# Patient Record
Sex: FEMALE | Race: ASIAN | URBAN - METROPOLITAN AREA
[De-identification: names, ages, dates, MRNs, and addresses within clinical notes are randomized per-mention and may not be internally consistent; named-entity substitution may affect disease eponyms.]

---

## 2017-06-06 ENCOUNTER — HOSPITAL ENCOUNTER (OUTPATIENT)
Dept: ULTRASOUND IMAGING | Facility: CLINIC | Age: 72
End: 2017-06-06
Attending: FAMILY MEDICINE
Payer: MEDICARE

## 2017-06-06 ENCOUNTER — HOSPITAL ENCOUNTER (OUTPATIENT)
Dept: MAMMOGRAPHY | Facility: CLINIC | Age: 72
Discharge: HOME | End: 2017-06-06
Attending: FAMILY MEDICINE | Admitting: FAMILY MEDICINE
Payer: MEDICARE

## 2017-06-06 DIAGNOSIS — R92.8 ABNORMAL MAMMOGRAM: ICD-10-CM

## 2017-06-06 PROCEDURE — 76642 ULTRASOUND BREAST LIMITED: CPT | Mod: LT

## 2017-06-06 PROCEDURE — G0206 DX MAMMO INCL CAD UNI: HCPCS

## 2017-10-05 ENCOUNTER — OFFICE VISIT (OUTPATIENT)
Dept: FAMILY MEDICINE | Facility: CLINIC | Age: 72
End: 2017-10-05
Payer: MEDICARE

## 2017-10-05 VITALS
WEIGHT: 120.3 LBS | SYSTOLIC BLOOD PRESSURE: 175 MMHG | RESPIRATION RATE: 16 BRPM | TEMPERATURE: 97.5 F | DIASTOLIC BLOOD PRESSURE: 85 MMHG | HEART RATE: 77 BPM

## 2017-10-05 DIAGNOSIS — Z71.84 TRAVEL ADVICE ENCOUNTER: Primary | ICD-10-CM

## 2017-10-05 DIAGNOSIS — A09 TRAVELER'S DIARRHEA: ICD-10-CM

## 2017-10-05 PROCEDURE — 90691 TYPHOID VACCINE IM: CPT | Performed by: FAMILY MEDICINE

## 2017-10-05 PROCEDURE — 90471 IMMUNIZATION ADMIN: CPT | Performed by: FAMILY MEDICINE

## 2017-10-05 PROCEDURE — 99402 PREV MED CNSL INDIV APPRX 30: CPT | Mod: 25 | Performed by: FAMILY MEDICINE

## 2017-10-05 RX ORDER — ATORVASTATIN CALCIUM 20 MG/1
20 TABLET, FILM COATED ORAL
COMMUNITY
Start: 2017-05-03

## 2017-10-05 RX ORDER — LISINOPRIL 20 MG/1
20 TABLET ORAL
COMMUNITY
Start: 2017-10-04

## 2017-10-05 RX ORDER — METFORMIN HYDROCHLORIDE 500 MG/1
500 TABLET, FILM COATED, EXTENDED RELEASE ORAL
COMMUNITY
Start: 2017-10-04

## 2017-10-05 RX ORDER — ASPIRIN 81 MG/1
81 TABLET ORAL
COMMUNITY
Start: 2016-06-17

## 2017-10-05 RX ORDER — CIPROFLOXACIN 500 MG/1
500 TABLET, FILM COATED ORAL 2 TIMES DAILY
Qty: 12 TABLET | Refills: 0 | Status: SHIPPED | OUTPATIENT
Start: 2017-10-05

## 2017-10-05 NOTE — PROGRESS NOTES
Itinerary:  [place]  Departure Date: 10/22/2017, Return Date: 04/18/2018  Reason for Travel (i.e. business, pleasure): pleasure  Visiting an urban or rural area? both  Accommodations (i.e. hotel, hostel, friends, family etc.): family  Women - First day of your last period: NA    IMMUNIZATION HISTORY  Have you received any vaccinations in the past 4 weeks?  Yes ---flu vaccine  Have you ever fainted from having your blood drawn or from an injection?  No  Have you ever had a fever reaction to a vaccination?  No  Have you ever had any bad reaction / side effect from any vaccination?  No  Have you ever had hepatitis A or B vaccine?  Yes  Do you live (or work closely with anyone who has AIDS, or any other immune disorder, or who is on chemotherapy for cancer or family history of immunodeficiency?  No  Have you received any injection of immune globulin or any blood products during the past 12 months?  No    GENERAL MEDICAL HISTORY  Do you have a medical condition that warrants maintenance medication or physician follow-up?  No  Do you have a medical condition that is stable now, but that may recur while traveling?  No  Has your spleen been removed?   No  Have you had an acute illness or a fever in the past 48 hours?  No  Are you pregnant or might you become pregnant on this trip? Any chance of pregnancy?  No  Are you breastfeeding?  No  Do you have HIV, AIDS, an AIDS-like condition, any other immune disorder, leukemia or cancer?  No  Do you have a severe combined immunodeficiency disease?  No  Have you had your thymus gland removed or a history of problems with your thymus, such as myasthenia gravis, DiGeorge syndrome, or thymoma?  No  Do you have severe thrombocytopenia (low platelet count) or blood clotting disorder?  No  Have you ever had a convulsion, seizure, epilepsy, neurologic condition or brain infection?  No  Do you have any stomach conditions?  No  Do you have a G6PD deficiency?  No  Do you have severe renal  or kidney impairment?  No  Do you have a history of psychiatric problems?  No  Do you have a problem with strange dreams and/or nightmares?  No  Do you have insomnia?  No  Do you have problems with vaginitis?  No  Do you have psoriasis?  No  Are you prone to motion sickness?  No  Have you ever had headaches, nausea, vomiting or breathing problems from altitude exposure?  No    MEDICATIONS  ARE YOU TAKING:  Steroids, prednisone, or anti-cancer drugs?  No  Antibiotics or sulfonamides?  No  Oral contraceptives?  No  Aspirin therapy? (children & adolescents)  No    ALLERGIES  ARE YOU ALLERGIC TO:  Any medications?  No  Any foods or other?  No     SUBJECTIVE: Jamarcus Hernández , a 71 year old  female, presents for counseling and information regarding upcoming travel to [facility], [facility]. Special medical concerns   include: none.  She. anticipates the following unusual exposures: none.     Past Medical History:   Diagnosis Date     Diabetes (H)      Hypertension       Immunization History   Administered Date(s) Administered     HepA-Adult 07/13/2007, 12/03/2009     Hepatitis B Immunity: Titer 01/01/2015     Influenza (High Dose) 3 valent vaccine 10/04/2017     Pneumococcal (PCV 13) 05/15/2015     Pneumococcal 23 valent 10/20/2010     TDAP Vaccine (Adacel) 11/26/2014     Typhoid IM 10/05/2017     Zoster vaccine, live 05/15/2015       Current Outpatient Prescriptions   Medication Sig Dispense Refill     blood glucose monitoring (HENRIK CONTOUR) test strip Test glucose once daily       blood glucose monitoring (HENRIK MICROLET) lancets Check glucose once daily       aspirin EC 81 MG EC tablet Take 81 mg by mouth       atorvastatin (LIPITOR) 20 MG tablet Take 20 mg by mouth Take 20 mg by mouth       lisinopril (PRINIVIL/ZESTRIL) 20 MG tablet Take 20 mg by mouth Take 20 mg by mouth       metFORMIN modified (GLUMETZA) 500 MG 24 hr tablet Take 500 mg by mouth Take 500 mg by mouth       ciprofloxacin (CIPRO) 500 MG tablet  Take 1 tablet (500 mg) by mouth 2 times daily x 3 days For traveler's diarrhea 12 tablet 0     [DISCONTINUED] ATORVASTATIN CALCIUM PO        [DISCONTINUED] LISINOPRIL PO        [DISCONTINUED] METFORMIN HCL PO        No Known Allergies     EXAM: deferred  Immunizations discussed include: Typhoid  Malaraia prophylaxis recommended: none  Symptomatic treatment for traveler's diarrhea: ciprofloxacin    ASSESSMENT/PLAN:  (Z71.89) Travel advice encounter  (primary encounter diagnosis)  Plan: TYPHOID VACCINE, IM    (A09) Traveler's diarrhea  Plan: ciprofloxacin (CIPRO) 500 MG tablet    I have reviewed general recommendations for safe travel   including: food/water precautions, insect avoidance, safe sex   practices given high prevalence of HIV and other STDs,   roadway safety. Educational materials and links to the [employer]  Traveler's health website have been provided.    Total time 30 minutes, greater than 50 percent in counseling   and coordination of care.    Celeste Diaz MD   [facility]

## 2017-10-05 NOTE — NURSING NOTE
Chief Complaint   Patient presents with     Travel Clinic     pt traveling to [place], date of departure 10/22/2017 and returning on 04/18/2018       Initial /85 (BP Location: Right arm, Patient Position: Chair, Cuff Size: Adult Regular)  Pulse 77  Temp 97.5  F (36.4  C) (Oral)  Resp 16  Wt 120 lb 4.8 oz (54.6 kg)  Breastfeeding? No There is no height or weight on file to calculate BMI.  Medication Reconciliation: complete     Krystyna Almeida/STEVENSON  [place]

## 2017-10-05 NOTE — MR AVS SNAPSHOT
"              After Visit Summary   10/5/2017    Jamarcus Hernández    MRN: [id number]           Patient Information     Date Of Birth          1945        Visit Information        Provider Department      10/5/2017 10:00 AM Celeste Multani MD; [employer] [facility]        Today's Diagnoses     Travel advice encounter    -  1    Traveler's diarrhea          Care Instructions    See handout provided          Follow-ups after your visit        Who to contact     If you have questions or need follow up information about today's clinic visit or your schedule please contact [facility] directly at [phone].  Normal or non-critical lab and imaging results will be communicated to you by MyChart, letter or phone within 4 business days after the clinic has received the results. If you do not hear from us within 7 days, please contact the clinic through [employer] or phone. If you have a critical or abnormal lab result, we will notify you by phone as soon as possible.  Submit refill requests through [employer] or call your pharmacy and they will forward the refill request to us. Please allow 3 business days for your refill to be completed.          Additional Information About Your Visit        MyChart Information     [employer] lets you send messages to your doctor, view your test results, renew your prescriptions, schedule appointments and more. To sign up, go to [email or web address] . Click on \"Log in\" on the left side of the screen, which will take you to the Welcome page. Then click on \"Sign up Now\" on the right side of the page.     You will be asked to enter the access code listed below, as well as some personal information. Please follow the directions to create your username and password.     Your access code is: [email or web address]  Expires: 1/3/2018 10:43 AM     Your access code will  in 90 days. If you need help or a new code, please call your [place] [facility] or [phone].        Care EveryWhere ID     This is your Care EveryWhere ID. This could be used by other organizations to access your [place] medical records  [id number]        Your Vitals Were     Pulse Temperature Respirations Breastfeeding?          77 97.5  F (36.4  C) (Oral) 16 No         Blood Pressure from Last 3 Encounters:   10/05/17 175/85   05/28/15 138/76   08/18/12 157/96    Weight from Last 3 Encounters:   10/05/17 120 lb 4.8 oz (54.6 kg)              Today, you had the following     No orders found for display         Today's Medication Changes          These changes are accurate as of: 10/5/17 10:43 AM.  If you have any questions, ask your nurse or doctor.               Start taking these medicines.        Dose/Directions    ciprofloxacin 500 MG tablet   Commonly known as:  CIPRO   Used for:  Traveler's diarrhea   Started by:  Celeste Multani MD        Dose:  500 mg   Take 1 tablet (500 mg) by mouth 2 times daily x 3 days For traveler's diarrhea   Quantity:  12 tablet   Refills:  0         These medicines have changed or have updated prescriptions.        Dose/Directions    atorvastatin 20 MG tablet   Commonly known as:  LIPITOR   This may have changed:  Another medication with the same name was removed. Continue taking this medication, and follow the directions you see here.   Changed by:  Celeste Multani MD        Dose:  20 mg   Take 20 mg by mouth Take 20 mg by mouth   Refills:  0       lisinopril 20 MG tablet   Commonly known as:  PRINIVIL/ZESTRIL   This may have changed:  Another medication with the same name was removed. Continue taking this medication, and follow the directions you see here.   Changed by:  Celeste Multani MD        Dose:  20 mg   Take 20 mg by mouth Take 20 mg by mouth   Refills:  0       metFORMIN modified 500 MG 24 hr tablet   Commonly known as:  GLUMETZA   This may have changed:  Another medication with the same name was  removed. Continue taking this medication, and follow the directions you see here.   Changed by:  Celeste Multani MD        Dose:  500 mg   Take 500 mg by mouth Take 500 mg by mouth   Refills:  0         Stop taking these medicines if you haven't already. Please contact your care team if you have questions.     ASPIRIN PO   Stopped by:  Celeste Multnai MD           meclizine 25 MG tablet   Commonly known as:  ANTIVERT   Stopped by:  Celeste Multani MD           traMADol 50 MG tablet   Commonly known as:  ULTRAM   Stopped by:  Celeste Multani MD           TYLENOL PO   Stopped by:  Celeste Multani MD                Where to get your medicines      These medications were sent to [id number] [address] [address]     Phone:  [phone]     ciprofloxacin 500 MG tablet                Primary Care Provider Fax #    Physician No Ref-Primary [phone]       No address on file        Equal Access to Services     [facility]: Hadii aad ku hadasho Sogilberto, waaxda luqadaha, qaybta kaalmadragan mac, praveen luu . So [facility] [phone].    ATENCIÓN: Si habla español, tiene a arora disposición servicios gratuitos de asistencia lingüística. [facility] [phone].    We comply with applicable federal civil rights laws and [place] laws. We do not discriminate on the basis of race, color, national origin, age, disability, sex, sexual orientation, or gender identity.            Thank you!     Thank you for choosing [facility]  for your care. Our goal is always to provide you with excellent care. Hearing back from our patients is one way we can continue to improve our services. Please take a few minutes to complete the written survey that you may receive in the mail after your visit with us. Thank you!             Your Updated Medication List -  Protect others around you: Learn how to safely use, store and throw away your medicines at www.disposemymeds.org.          This list is accurate as of: 10/5/17 10:43 AM.  Always use your most recent med list.                   Brand Name Dispense Instructions for use Diagnosis    aspirin EC 81 MG EC tablet      Take 81 mg by mouth        atorvastatin 20 MG tablet    LIPITOR     Take 20 mg by mouth Take 20 mg by mouth        HENRIK CONTOUR test strip   Generic drug:  blood glucose monitoring      Test glucose once daily        blood glucose monitoring lancets      Check glucose once daily        ciprofloxacin 500 MG tablet    CIPRO    12 tablet    Take 1 tablet (500 mg) by mouth 2 times daily x 3 days For traveler's diarrhea    Traveler's diarrhea       lisinopril 20 MG tablet    PRINIVIL/ZESTRIL     Take 20 mg by mouth Take 20 mg by mouth        metFORMIN modified 500 MG 24 hr tablet    GLUMETZA     Take 500 mg by mouth Take 500 mg by mouth